# Patient Record
Sex: MALE | Race: WHITE | NOT HISPANIC OR LATINO | Employment: STUDENT | URBAN - METROPOLITAN AREA
[De-identification: names, ages, dates, MRNs, and addresses within clinical notes are randomized per-mention and may not be internally consistent; named-entity substitution may affect disease eponyms.]

---

## 2017-01-23 ENCOUNTER — TRANSCRIBE ORDERS (OUTPATIENT)
Dept: RADIOLOGY | Facility: CLINIC | Age: 67
End: 2017-01-23

## 2017-01-23 ENCOUNTER — HOSPITAL ENCOUNTER (OUTPATIENT)
Dept: RADIOLOGY | Facility: CLINIC | Age: 67
Discharge: HOME | End: 2017-01-23
Payer: COMMERCIAL

## 2017-01-23 DIAGNOSIS — N20.0 CALCULUS OF KIDNEY: ICD-10-CM

## 2017-01-23 PROCEDURE — 74000 HB X-RAY EXAM OF ABDOMEN (SINGLE ANTEROPOSTERIOR VIEW): CPT

## 2017-01-30 ENCOUNTER — ALLSCRIPTS OFFICE VISIT (OUTPATIENT)
Dept: OTHER | Facility: OTHER | Age: 67
End: 2017-01-30

## 2017-01-30 LAB
BILIRUB UR QL STRIP: NORMAL
CLARITY UR: NORMAL
COLOR UR: YELLOW
GLUCOSE (HISTORICAL): NORMAL
HGB UR QL STRIP.AUTO: NORMAL
KETONES UR STRIP-MCNC: NORMAL MG/DL
LEUKOCYTE ESTERASE UR QL STRIP: NORMAL
NITRITE UR QL STRIP: NORMAL
PH UR STRIP.AUTO: 5 [PH]
PROT UR STRIP-MCNC: NORMAL MG/DL
SP GR UR STRIP.AUTO: 1.02
UROBILINOGEN UR QL STRIP.AUTO: NORMAL

## 2017-06-19 ENCOUNTER — ALLSCRIPTS OFFICE VISIT (OUTPATIENT)
Dept: OTHER | Facility: OTHER | Age: 67
End: 2017-06-19

## 2017-06-27 ENCOUNTER — GENERIC CONVERSION - ENCOUNTER (OUTPATIENT)
Dept: OTHER | Facility: OTHER | Age: 67
End: 2017-06-27

## 2017-06-27 LAB
A/G RATIO (HISTORICAL): 1.7 (ref 1.2–2.2)
ALBUMIN SERPL BCP-MCNC: 4.4 G/DL (ref 3.6–4.8)
ALP SERPL-CCNC: 99 IU/L (ref 39–117)
ALT SERPL W P-5'-P-CCNC: 31 IU/L (ref 0–44)
AMBIG ABBREV CMP14 DEFAULT (HISTORICAL): NORMAL
AST SERPL W P-5'-P-CCNC: 34 IU/L (ref 0–40)
BILIRUB SERPL-MCNC: 0.6 MG/DL (ref 0–1.2)
BUN SERPL-MCNC: 17 MG/DL (ref 8–27)
BUN/CREA RATIO (HISTORICAL): 14 (ref 10–24)
CALCIUM SERPL-MCNC: 9.4 MG/DL (ref 8.6–10.2)
CHLORIDE SERPL-SCNC: 100 MMOL/L (ref 96–106)
CHOLEST SERPL-MCNC: 188 MG/DL (ref 100–199)
CHOLEST/HDLC SERPL: 4 RATIO UNITS (ref 0–5)
CO2 SERPL-SCNC: 23 MMOL/L (ref 18–29)
CREAT SERPL-MCNC: 1.22 MG/DL (ref 0.76–1.27)
DEPRECATED RDW RBC AUTO: 13.8 % (ref 12.3–15.4)
EGFR AFRICAN AMERICAN (HISTORICAL): 72 ML/MIN/1.73
EGFR-AMERICAN CALC (HISTORICAL): 62 ML/MIN/1.73
GLUCOSE SERPL-MCNC: 117 MG/DL (ref 65–99)
HBA1C MFR BLD HPLC: 5.9 % (ref 4.8–5.6)
HCT VFR BLD AUTO: 41.7 % (ref 37.5–51)
HDLC SERPL-MCNC: 47 MG/DL
HGB BLD-MCNC: 14 G/DL (ref 12.6–17.7)
LDLC SERPL CALC-MCNC: 119 MG/DL (ref 0–99)
MCH RBC QN AUTO: 30 PG (ref 26.6–33)
MCHC RBC AUTO-ENTMCNC: 33.6 G/DL (ref 31.5–35.7)
MCV RBC AUTO: 90 FL (ref 79–97)
POTASSIUM SERPL-SCNC: 4.8 MMOL/L (ref 3.5–5.2)
RBC (HISTORICAL): 4.66 X10E6/UL (ref 4.14–5.8)
SODIUM SERPL-SCNC: 139 MMOL/L (ref 134–144)
TOT. GLOBULIN, SERUM (HISTORICAL): 2.6 G/DL (ref 1.5–4.5)
TOTAL PROTEIN (HISTORICAL): 7 G/DL (ref 6–8.5)
TRIGL SERPL-MCNC: 111 MG/DL (ref 0–149)
VLDLC SERPL CALC-MCNC: 22 MG/DL (ref 5–40)
WBC # BLD AUTO: 5.6 X10E3/UL (ref 3.4–10.8)

## 2017-06-28 ENCOUNTER — GENERIC CONVERSION - ENCOUNTER (OUTPATIENT)
Dept: OTHER | Facility: OTHER | Age: 67
End: 2017-06-28

## 2017-06-28 LAB
HEP C RNA QUAL (HISTORICAL): <0.1 S/CO RATIO (ref 0–0.9)
INTERPRETATION (HISTORICAL): NORMAL

## 2017-06-29 ENCOUNTER — GENERIC CONVERSION - ENCOUNTER (OUTPATIENT)
Dept: OTHER | Facility: OTHER | Age: 67
End: 2017-06-29

## 2017-08-06 DIAGNOSIS — Z12.5 ENCOUNTER FOR SCREENING FOR MALIGNANT NEOPLASM OF PROSTATE: ICD-10-CM

## 2017-08-06 DIAGNOSIS — N20.0 CALCULUS OF KIDNEY: ICD-10-CM

## 2017-09-06 ENCOUNTER — TRANSCRIBE ORDERS (OUTPATIENT)
Dept: RADIOLOGY | Facility: CLINIC | Age: 67
End: 2017-09-06

## 2017-09-06 ENCOUNTER — APPOINTMENT (OUTPATIENT)
Dept: RADIOLOGY | Facility: CLINIC | Age: 67
End: 2017-09-06
Payer: COMMERCIAL

## 2017-09-06 ENCOUNTER — GENERIC CONVERSION - ENCOUNTER (OUTPATIENT)
Dept: OTHER | Facility: OTHER | Age: 67
End: 2017-09-06

## 2017-09-06 DIAGNOSIS — N20.0 CALCULUS OF KIDNEY: ICD-10-CM

## 2017-09-06 LAB — PROSTATE SPECIFIC ANTIGEN (HISTORICAL): 0.7 NG/ML (ref 0–4)

## 2017-09-06 PROCEDURE — 74000 HB X-RAY EXAM OF ABDOMEN (SINGLE ANTEROPOSTERIOR VIEW): CPT

## 2017-09-13 ENCOUNTER — ALLSCRIPTS OFFICE VISIT (OUTPATIENT)
Dept: OTHER | Facility: OTHER | Age: 67
End: 2017-09-13

## 2017-12-04 ENCOUNTER — ALLSCRIPTS OFFICE VISIT (OUTPATIENT)
Dept: OTHER | Facility: OTHER | Age: 67
End: 2017-12-04

## 2017-12-04 DIAGNOSIS — R73.01 IMPAIRED FASTING GLUCOSE: ICD-10-CM

## 2017-12-04 DIAGNOSIS — I10 ESSENTIAL (PRIMARY) HYPERTENSION: ICD-10-CM

## 2017-12-05 NOTE — PROGRESS NOTES
Assessment    1  Benign essential hypertension (401 1) (I10)   · 10/06   2  Impaired fasting glucose (790 21) (R73 01)    Plan  Benign essential hypertension, Impaired fasting glucose    · (1) COMPREHENSIVE METABOLIC PANEL; Status:Active; Requested for:63Dky5693;    · (1) HEMOGLOBIN A1C; Status:Active; Requested for:58Fve5540;     Discussion/Summary    Patient appears to be doing well--hypertension well controlled on lisinopril 10 mg daily, continue dose  Will plan to recheck blood work in the next few weeks to reassess impaired fasting glucose  RTO 6 months for complete physical exam    The patient was counseled regarding diagnostic results,-- instructions for management,-- risk factor reductions,-- prognosis,-- patient and family education,-- impressions,-- risks and benefits of treatment options,-- importance of compliance with treatment  Possible side effects of new medications were reviewed with the patient/guardian today  The treatment plan was reviewed with the patient/guardian  The patient/guardian understands and agrees with the treatment plan      Chief Complaint  Pt here for 6 months f/u B/P      History of Present Illness  Patient presents for routine blood pressure checkhas history of impaired fasting glucosefeeling well todaymedication problems or concernsbeen trying to follow a healthy diet--avoids most âsweetsâpolyuria, polydipsia, blurry vision, abdominal painnot check his blood sugar at home, no tools to do so   The patient presents for follow-up of essential hypertension  The patient states he has been doing well with his blood pressure control since the last visit  He has no significant interval events  Symptoms: denies impaired vision,-- denies dyspnea,-- denies chest pain,-- denies intermittent leg claudication-- and-- denies lower extremity edema  Associated symptoms include no headache,-- no focal neurologic deficits-- and-- no memory loss  Home monitoring:  The patient is not checking blood pressure at home  Medications: the patient is adherent with his medication regimen  -- He denies medication side effects  Review of Systems   Constitutional: No fever or chills, feels well, no tiredness, no recent weight gain or weight loss  Eyes: No complaints of eye pain, no red eyes, no discharge from eyes, no itchy eyes  ENT: no complaints of earache, no hearing loss, no nosebleeds, no nasal discharge, no sore throat, no hoarseness  Cardiovascular: No complaints of slow heart rate, no fast heart rate, no chest pain, no palpitations, no leg claudication, no lower extremity  Respiratory: No complaints of shortness of breath, no wheezing, no cough, no SOB on exertion, no orthopnea or PND  Gastrointestinal: No complaints of abdominal pain, no constipation, no nausea or vomiting, no diarrhea or bloody stools  Genitourinary: No complaints of dysuria, no incontinence, no hesitancy, no nocturia, no genital lesion, no testicular pain  Musculoskeletal: No complaints of arthralgia, no myalgias, no joint swelling or stiffness, no limb pain or swelling  Integumentary: No complaints of skin rash or skin lesions, no itching, no skin wound, no dry skin  Neurological: No compliants of headache, no confusion, no convulsions, no numbness or tingling, no dizziness or fainting, no limb weakness, no difficulty walking  Psychiatric: Is not suicidal, no sleep disturbances, no anxiety or depression, no change in personality, no emotional problems  Endocrine: No complaints of proptosis, no hot flashes, no muscle weakness, no erectile dysfunction, no deepening of the voice, no feelings of weakness  Hematologic/Lymphatic: No complaints of swollen glands, no swollen glands in the neck, does not bleed easily, no easy bruising  Active Problems  1  Benign essential hypertension (401 1) (I10)   2  Enlarged prostate without lower urinary tract symptoms (luts) (600 00) (N40 0)   3   Erectile dysfunction of non-organic origin (302 72) (F52 21)   4  H/O adenomatous polyp of colon (V12 72) (Z86 010)   5  Impaired fasting glucose (790 21) (R73 01)   6  Rotator cuff tendinitis, left (726 10) (M75 82)    Past Medical History  1  History of Calculus of urinary bladder (594 1) (N21 0)   2  History of Urinary Tract Infection (V13 02)    The active problems and past medical history were reviewed and updated today  Surgical History  1  History of Complete Colonoscopy   2  History of Cystoscopy With Fragmentation Of Bladder Calculus   3  History of Needle Biopsy Of Prostate   4  History of Transurethral Resection Of Prostate (TURP)    The surgical history was reviewed and updated today  Family History  Mother    1  Family history of Hyperlipidemia  Father    2  Family history of Cirrhosis  Family History    3  Denied: Family history of mental disorder   4  Denied: Family history of substance abuse    The family history was reviewed and updated today  Social History     · Being A Social Drinker   · Daily Coffee Consumption (1  Cups/Day)   · Educational Level - Completed Bachelors Degree   · Marital History - Currently    · Never A Smoker   · Occupation:  The social history was reviewed and updated today  The social history was reviewed and is unchanged  Current Meds   1  Lisinopril 10 MG Oral Tablet; take 1 tablet by mouth every day; Therapy: 42WMH7879 to (OFWXVJSX:52YUI2854)  Requested for: 10MYR2314; Last Rx:29Jun2017 Ordered    The medication list was reviewed and updated today  Allergies  1  No Known Drug Allergies    Vitals  Vital Signs    Recorded: 28FFI4068 08:22AM   Temperature 98 3 F   Heart Rate 79   Systolic 985   Diastolic 70   Weight 534 lb 6 oz   BMI Calculated 29 71   BSA Calculated 2 31   O2 Saturation 98       Physical Exam   Constitutional  General appearance: No acute distress, well appearing and well nourished  Eyes  Conjunctiva and lids: No swelling, erythema, or discharge  Ears, Nose, Mouth, and Throat  External inspection of ears and nose: Normal    Oropharynx: Normal with no erythema, edema, exudate or lesions  Pulmonary  Respiratory effort: No increased work of breathing or signs of respiratory distress  Auscultation of lungs: Clear to auscultation, equal breath sounds bilaterally, no wheezes, no rales, no rhonci  Cardiovascular  Auscultation of heart: Normal rate and rhythm, normal S1 and S2, without murmurs  Examination of extremities for edema and/or varicosities: Normal    Abdomen  Abdomen: Non-tender, no masses  Lymphatic  Palpation of lymph nodes in neck: No lymphadenopathy  Musculoskeletal  Gait and station: Normal    Skin  Skin and subcutaneous tissue: Normal without rashes or lesions  Neurologic  Cranial nerves: Cranial nerves 2-12 intact  Psychiatric  Orientation to person, place and time: Normal    Mood and affect: Normal          Health Management  H/O adenomatous polyp of colon   COLONOSCOPY; every 5 years; Last 31FQU2457; Next Due: [id number];  Active    Future Appointments    Date/Time Provider Specialty Site   09/17/2018 09:45 AM Stacie Cevallos, [address]  Urology [facility] Gabriella Munguia       Signatures   Electronically signed by : Rebecca Arredondo DO; Dec  4 2017  8:58AM EST                       (Author)

## 2018-01-12 VITALS
WEIGHT: 232 LBS | DIASTOLIC BLOOD PRESSURE: 78 MMHG | SYSTOLIC BLOOD PRESSURE: 128 MMHG | HEART RATE: 80 BPM | HEIGHT: 74 IN | BODY MASS INDEX: 29.77 KG/M2

## 2018-01-12 NOTE — PROGRESS NOTES
Assessment    1  Nephrolithiasis (592 0) (N20 0)    Plan  Nephrolithiasis    · Hydrocodone-Acetaminophen 5-325 MG Oral Tablet (Norco); TAKE 1 TO 2  TABLETS EVERY 4 TO 6 HOURS AS NEEDED FOR PAIN   Rx By: Fito Griffin; Dispense: 15 Days ; #:15 Tablet; Refill: 0; For: Nephrolithiasis; SOWMYA = N; Print Rx   · Schedule Surgery Treatment  Procedure  Schedule OR [facility] Monday, February 1, 2016  Right #5 special    Preadmission testing as required  Status: Hold For - Scheduling  Requested for:  54TFD9939   Ordered; For: Nephrolithiasis; Ordered By: Fito Griffin Performed:  Due: 23HUM6088    Discussion/Summary  Discussion Summary:   My impression is 7 mm right proximal ureteral calculus  Based on the size and location of the stone I am recommending intervention with right-sided ureteroscopy and holmium laser lithotripsy  I will place a right ureteral stent at that time as well  Risk and benefits of the procedure were discussed and reviewed and informed consent was obtained in the office today  Chief Complaint  Chief Complaint Free Text Note Form: Patient is here for ER f/u nephrolithiasis      History of Present Illness  HPI: Meaghan Robins is a 62-year-old male who underwent TURP in 2013 for urinary retention  He recently presented to the emergency room complaining of right-sided flank pain  A CT scan showed a 7 mm right proximal ureteral calculus with right-sided hydronephrosis  He previously reported some nausea and vomiting which has resolved  He still has intermittent pain and is requiring narcotics  He presents to the office today in follow-up from the emergency room visit  He denies any prior history of kidney stones  He denies any hematuria  He denies any fevers or chills at this time  Active Problems    1  Benign essential hypertension (401 1) (I10)   2  Enlarged prostate without lower urinary tract symptoms (luts) (600 00) (N40 0)   3   Erectile dysfunction of non-organic origin (302 72) (F52 21)   4  Flu vaccine need (V04 81) (Z23)   5  H/O adenomatous polyp of colon (V12 72) (Z86 010)   6  Nephrolithiasis (592 0) (N20 0)   7  Rotator cuff tendinitis, left (726 10) (M75 82)   8  Shoulder pain, left (719 41) (M25 512)   9  Special screening examination for neoplasm of prostate (V76 44) (Z12 5)   10  Urgency of urination (788 63) (R39 15)    Past Medical History    1  History of Calculus of urinary bladder (594 1) (N21 0)   2  History of Urinary Tract Infection (V13 02)    Surgical History    1  History of Complete Colonoscopy   2  History of Cystoscopy With Fragmentation Of Bladder Calculus   3  History of Needle Biopsy Of Prostate   4  History of Transurethral Resection Of Prostate (TURP)    Family History    1  Family history of Hyperlipidemia    2  Family history of Cirrhosis    Social History    · Being A Social Drinker   · Daily Coffee Consumption (1  Cups/Day)   · Educational Level - Completed Bachelors Degree   · Marital History - Currently    · Never A Smoker   · Occupation:    Current Meds   1  Lisinopril 10 MG Oral Tablet; take one tablet by mouth every day; Therapy: 21EJH7856 to (Leobardo Varela)  Requested for: 88KJC0867; Last   Rx:01Jun2015 Ordered    Allergies    1  No Known Drug Allergies    Vitals  Vital Signs [Data Includes: Current Encounter]    Recorded: 12VWY2663 01:30PM   Heart Rate 76   Systolic 528   Diastolic 82   Height 6 ft 1 in   Weight 234 lb 2 08 oz   BMI Calculated 30 89   BSA Calculated 2 29     Physical Exam    Additional Exam:  On examination he is in no acute distress  His abdomen is soft nontender and nondistended   examination reveals no CVA tenderness  Lungs are clear  Cardiac is regular  Results/Data  Diagnostic Studies Reviewed Urology:   CT Scan Review CT scan as above  Future Appointments    Date/Time Provider Specialty Site   06/13/2016 08:00 AM PERLA Tena   Family Medicine [facility]   02/19/2016 08:10 AM PERLA Cotton   Orthopedic Surgery [facility] ORTHO SPECIALIST [place]   08/19/2016 09:45 AM Nicko Lin MD Urology [address]     Signatures   Electronically signed by : Estrella Whitney MD; Jan 28 2016  2:21PM EST                       (Author)

## 2018-01-13 VITALS
HEIGHT: 74 IN | OXYGEN SATURATION: 97 % | HEART RATE: 81 BPM | WEIGHT: 230 LBS | SYSTOLIC BLOOD PRESSURE: 124 MMHG | DIASTOLIC BLOOD PRESSURE: 72 MMHG | TEMPERATURE: 97.5 F | BODY MASS INDEX: 29.52 KG/M2

## 2018-01-13 NOTE — PROGRESS NOTES
Assessment    1  Encounter for preventive health examination (V70 0) (Z00 00)   2  Impaired fasting glucose (790 21) (R73 01)   3  Benign essential hypertension (401 1) (I10)   · 10/06   4  Need for hepatitis C screening test (V73 89) (Z11 59)    Plan  Benign essential hypertension, Health Maintenance, Impaired fasting glucose    · (1) CBC/ PLT (NO DIFF); Status:Active; Requested ZGJ:99CXG4835;    · (1) COMPREHENSIVE METABOLIC PANEL; Status:Active; Requested WEW:99OFU4985;    · (1) HEMOGLOBIN A1C; Status:Active; Requested XPX:86PFQ4905;    · (1) LIPID PANEL, FASTING; Status:Active; Requested IJJ:21JOL0175;   Benign essential hypertension, Impaired fasting glucose    · Follow-up visit in 6 months Evaluation and Treatment  Follow-up  Status: Hold For -  Scheduling  Requested for: 73JYK1152  Health Maintenance    · Alcohol misuse can be a problem with advancing age ; Status:Complete;   Done:  68NXR8831 09:34AM   · Always use a seat belt and shoulder strap when riding or driving a motor vehicle ;  Status:Complete;   Done: 91MAS0884 09:34AM   · Be sure to tell us about all herbal supplements you use ; Status:Complete;   Done:  53RNC0517 09:34AM   · Brush your teeth {freq1} and floss at least once a day ; Status:Complete;   Done:  76NXG2483 09:34AM   · Drink plenty of fluids ; Status:Complete;   Done: 95ASX3222 09:34AM   · Regular aerobic exercise can help reduce stress ; Status:Complete;   Done: 63WQQ0416  09:34AM   · These are things you can do to prevent falls in and around the home ; Status:Complete;    Done: 16XHU5639 09:34AM   · Use a sun block product with an SPF of 15 or more ; Status:Complete;   Done:  41ECK7374 09:34AM   · We encourage you to begin to make lifestyle changes to help control your blood  pressure  These may include losing weight, increasing your activity level, limiting salt in  your diet, decreasing alcohol intake, and eating a diet low in fat and rich in fruits  and vegetables  ; Status:Complete;   Done: 55EUK6433 09:34AM   · We recommend routine visits to a dentist ; Status:Complete;   Done: 08HSL0409 09:34AM   · We recommend that you create an advance directive ; Status:Complete;   Done:  00BYQ6560 09:34AM   · We recommend that you follow the "Mediterranean diet "; Status:Complete;   Done:  61PQD8226 09:34AM   · We recommend that you follow these rules for gun safety ; Status:Complete;   Done:  03UFB0101 09:34AM   · We recommend that you follow these steps to lower your risk of osteoporosis  ;  Status:Complete;   Done: 55STL0617 09:34AM   · Call [phone] if: You have any warning signs of skin cancer ; Status:Complete;    Done: 85GZZ1121 09:34AM   · Call [phone] if: You experience a new kind of chest pain (angina) or pressure ;  Status:Complete;   Done: 15BXU6064 09:34AM  Health Maintenance, Need for hepatitis C screening test    · (1) HEP C ANTIBODY; Status:Active; Requested LSW:84JZS7094;   Screening for depression    · *VB-Depression Screening; Status:Complete - Retrospective By Protocol Authorization;    Done: 41BXA9157 09:28AM    Discussion/Summary  Impression: health maintenance visit, healthy adult male  Currently, he eats an adequate diet and has an adequate exercise regimen  Prostate cancer screening: the risks and benefits of prostate cancer screening were discussed, prostate cancer screening is current and prostate cancer screening is managed by Urology  Colorectal cancer screening: the risks and benefits of colorectal cancer screening were discussed and colorectal cancer screening is current  Screening lab work includes glucose and lipid profile  The immunizations are up to date and DUE FOR PREVNAR-13 -- 1 YR LATER PNEUMOVAX   He was advised to be evaluated by an optometrist and a dentist  Advice and education were given regarding nutrition, aerobic exercise, weight bearing exercise, calcium supplements, vitamin D supplements, cardiovascular risk reduction, sunscreen use, self skin examination and seat belt use  Patient discussion: discussed with the patient  The patient was counseled regarding diagnostic results, instructions for management, risk factor reductions, prognosis, patient and family education, impressions, risks and benefits of treatment options, importance of compliance with treatment  Immunization Counseling The parent/guardian was counseled on the following vaccine components: NZRGGYS-41  Total number of vaccine components counseled: 1  NO BARRIERS TO CARE   Educational resources provided: Diet/exercise, routine screenings, upcoming immunizations  Possible side effects of new medications were reviewed with the patient/guardian today  The treatment plan was reviewed with the patient/guardian  The patient/guardian understands and agrees with the treatment plan      Chief Complaint  Pt here for annual physical exam      History of Present Illness  HM, Adult Male: The patient is being seen for a health maintenance evaluation  The last health maintenance visit was 1 year(s) ago  Social History: Household members include spouse  He is   Work status: working full time and occupation: sales  The patient has never smoked cigarettes  He reports rare alcohol use and drinking 2-5 drinks per week  He has never used illicit drugs  General Health: The patient's health since the last visit is described as good  He has regular dental visits  He complains of vision problems  Vision care includes wearing reading glasses and no recent eye examination  He has hearing loss  (minimal)  Lifestyle:  He consumes a diverse and healthy diet (healthy, diverse -- protein from lean meat/fish )  He is on a Mediterranean-style diet and is generally adherent  He exercises regularly  He exercises 2-3 times per week  Exercise includes strength training, stretching/yoga/pilates and golfs weekly  Reproductive health:  the patient is sexually active  He complains of erectile dysfunction  Screening: cancer screening reviewed and current  Prostate cancer screening includes prostate-specific antigen testing last year  Colorectal cancer screening includes last colonoscopy done 2014  metabolic screening reviewed and current  Metabolic screening includes lipid profile performed 2016 and glucose screening performed last year  risk screening reviewed and current  Cardiovascular risk factors: hypertension, but no diabetes, no stress, no obesity, no tobacco use, no illicit drug use and no sedentary lifestyle  General health risks: previous colon polyps, but no family history of prostate cancer and no osteoporosis risk factors  Safety elements used: seat belt, safe driving habits, sunscreen, smoke detector and carbon monoxide detector  Review of Systems    Constitutional: No fever or chills, feels well, no tiredness, no recent weight gain or weight loss  Eyes: no eyesight problems and no purulent discharge from the eyes  ENT: hearing loss, but no earache, no sore throat and no nasal discharge  Cardiovascular: no chest pain, no palpitations and no extremity edema  Respiratory: no shortness of breath, no cough and no wheezing  Gastrointestinal: No complaints of abdominal pain, no constipation, no nausea or vomiting, no diarrhea or bloody stools  Genitourinary: no dysuria and no incontinence  Musculoskeletal: no arthralgias and no myalgias  Integumentary: no rashes and no skin lesions  Neurological: no headache, no numbness, no tingling and no confusion  Psychiatric: no anxiety and no depression  Endocrine: no hot flashes and no feelings of weakness  Hematologic/Lymphatic: no swollen glands, no tendency for easy bleeding and no tendency for easy bruising  Over the past 2 weeks, how often have you been bothered by the following problems? 1 ) Little interest or pleasure in doing things? Not at all    2 ) Feeling down, depressed or hopeless? Not at all         Active Problems    1  Benign essential hypertension (401 1) (I10)   2  Enlarged prostate without lower urinary tract symptoms (luts) (600 00) (N40 0)   3  Erectile dysfunction of non-organic origin (302 72) (F52 21)   4  Flu vaccine need (V04 81) (Z23)   5  H/O adenomatous polyp of colon (V12 72) (Z86 010)   6  Impaired fasting glucose (790 21) (R73 01)   7  Nephrolithiasis (592 0) (N20 0)   8  Rotator cuff tendinitis, left (726 10) (M75 82)   9  Shoulder pain, left (719 41) (M25 512)   10  Special screening examination for neoplasm of prostate (V76 44) (Z12 5)   11  Urgency of urination (788 63) (R39 15)    Past Medical History    · History of Calculus of urinary bladder (594 1) (N21 0)   · History of Urinary Tract Infection (V13 02)    Surgical History    · History of Complete Colonoscopy   · History of Cystoscopy With Fragmentation Of Bladder Calculus   · History of Needle Biopsy Of Prostate   · History of Transurethral Resection Of Prostate (TURP)    Family History  Mother    · Family history of Hyperlipidemia  Father    · Family history of Cirrhosis  Family History    · Denied: Family history of mental disorder   · Denied: Family history of substance abuse    Social History    · Being A Social Drinker   · Daily Coffee Consumption (1  Cups/Day)   · Educational Level - Completed Bachelors Degree   · Marital History - Currently    · 2 children   · Never A Smoker   · Occupation:   · office furniture sales    Current Meds   1  Lisinopril 10 MG Oral Tablet; take 1 tablet by mouth every day; Therapy: 40QCX5119 to (Evaluate:11Jun2017)  Requested for: 96EGK4239; Last   Rx:16Jun2016 Ordered    Allergies    1   No Known Drug Allergies    Vitals   Recorded: 47FUZ5566 08:59AM   Temperature 97 5 F, Tympanic   Heart Rate 81   Systolic 134   Diastolic 72   Height 6 ft 2 in   Weight 230 lb    BMI Calculated 29 53   BSA Calculated 2 31   O2 Saturation 97     Physical Exam    Constitutional   General appearance: No acute distress, well appearing and well nourished  Head and Face   Head and face: Normal     Palpation of the face and sinuses: No sinus tenderness  Eyes   Conjunctiva and lids: No erythema, swelling or discharge  Pupils and irises: Equal, round, reactive to light  Ears, Nose, Mouth, and Throat   External inspection of ears and nose: Normal     Otoscopic examination: Tympanic membranes translucent with normal light reflex  Canals patent without erythema  Hearing: Normal     Nasal mucosa, septum, and turbinates: Normal without edema or erythema  Lips, teeth, and gums: Normal, good dentition  Oropharynx: Normal with no erythema, edema, exudate or lesions  Neck   Neck: Supple, symmetric, trachea midline, no masses  Thyroid: Normal, no thyromegaly  Pulmonary   Respiratory effort: No increased work of breathing or signs of respiratory distress  Auscultation of lungs: Clear to auscultation  Cardiovascular   Auscultation of heart: Normal rate and rhythm, normal S1 and S2, no murmurs  Carotid pulses: 2+ bilaterally  Examination of extremities for edema and/or varicosities: Normal     Abdomen   Abdomen: Non-tender, no masses  Liver and spleen: No hepatomegaly or splenomegaly  Genitourinary deferred -- seeing Urology next week  Lymphatic   Palpation of lymph nodes in neck: No lymphadenopathy  Musculoskeletal   Gait and station: Normal     Inspection/palpation of digits and nails: Normal without clubbing or cyanosis  Inspection/palpation of joints, bones, and muscles: Normal     Range of motion: Normal     Stability: Normal     Muscle strength/tone: Normal     Skin   Skin and subcutaneous tissue: Normal without rashes or lesions  Neurologic   Cranial nerves: Cranial nerves 2-12 intact  Reflexes: 2+ and symmetric  Sensation: No sensory loss      Psychiatric   Orientation to person, place and time: Normal     Mood and affect: Normal        Health Management  H/O adenomatous polyp of colon   COLONOSCOPY; every 5 years; Last 00VQG6794; Next Due: [id number];  Active    Future Appointments    Date/Time Provider Specialty Site   09/13/2017 09:45 AM Leanna Chatterjee MD Urology [address]     Signatures   Electronically signed by : Ramón Garcia DO; Jun 19 2017  9:35AM EST                       (Author)

## 2018-01-14 VITALS
WEIGHT: 233 LBS | HEIGHT: 73 IN | DIASTOLIC BLOOD PRESSURE: 82 MMHG | SYSTOLIC BLOOD PRESSURE: 140 MMHG | BODY MASS INDEX: 30.88 KG/M2

## 2018-01-15 NOTE — PROGRESS NOTES
Assessment    1  Encounter for preventive health examination (V70 0) (Z00 00)   2  Benign essential hypertension (401 1) (I10)   · 10/06    Plan  Benign essential hypertension    · (1) CBC/PLT/DIFF; Status:Active; Requested FJN:[id number];    · (1) COMPREHENSIVE METABOLIC PANEL; Status:Active; Requested ODC:[id number];    · (1) LIPID PANEL FASTING W DIRECT LDL REFLEX; Status:Active; Requested  ONN:24AVW3603;    · (1) TSH WITH FT4 REFLEX; Status:Active; Requested FNM:65VYW6356; Health Maintenance    · Eat a low fat and low cholesterol diet ; Status:Complete;   Done: 28BWT0301   · Stretch and warm up your muscles during the first 10 minutes , then cool down your  muscles for the last 10 minutes of exercise ; Status:Complete;   Done: 67SID8465   · These are things you can do to prevent falls in and around the home ; Status:Complete;    Done: 71OOX5697   · Use a sun block product with an SPF of 15 or more ; Status:Complete;   Done:  30YVF5800   · We recommend routine visits to a dentist ; Status:Complete;   Done: 76YEY2123   · We recommend that you create an advance directive ; Status:Complete;   Done:  47AGQ7928   · Call [phone] if: You have any warning signs of skin cancer ; Status:Complete;    Done: 55SKM8825   · Call 911 if: You experience a new kind of chest pain (angina) or pressure ;  Status:Complete;   Done: 38AYD7773   · Follow-up visit in 6 months Evaluation and Treatment  Follow-up  Status: Hold For -  Scheduling  Requested for: 20Jun2016    Chief Complaint  Physical      History of Present Illness  HM, Adult Male: The patient is being seen for a health maintenance evaluation  The last health maintenance visit was 1 year(s) ago  Social History: Household members include spouse  He is   Work status: working full time  The patient has never smoked cigarettes  He reports occasional alcohol use  General Health: The patient's health since the last visit is described as good   He has regular dental visits  He complains of vision problems  Vision care includes wearing reading glasses and an eye examination more than a year ago  He denies hearing loss  Immunizations status: up to date  Lifestyle:  He consumes a diverse and healthy diet  He does not have any weight concerns  He exercises regularly  He exercises 3 or more times per week  Exercise includes golf  Reproductive health:  the patient is sexually active  He complains of erectile dysfunction  (Uses injections)  Screening: Prostate cancer screening includes prostate-specific antigen testing last year  Testicular cancer screening includes monthly self testicular examinations  Colorectal cancer screening includes a colonoscopy within the past ten years  Metabolic screening includes lipid profile performed 2015 and glucose screening performed 2015  Safety elements used: seat belt, safe driving habits, sunscreen, smoke detector and carbon monoxide detector  Review of Systems    Constitutional: not feeling poorly and not feeling tired  Eyes: no eye pain, no dryness of the eyes, eyes not red, no purulent discharge from the eyes and no itching of the eyes  ENT: no earache, no nosebleeds, no sore throat and no nasal discharge  Cardiovascular: no chest pain, no palpitations and no extremity edema  Respiratory: no shortness of breath, no cough and no wheezing  Gastrointestinal: no abdominal pain, no constipation, no diarrhea and no blood in stools  Genitourinary: Recent right kidney stone removed in February  , but no dysuria, no urinary hesitancy, no incontinence and no nocturia  Musculoskeletal: His shoulder has improved after a cortisone injection  Integumentary: no rashes and no skin lesions  Neurological: no headache, no dizziness and no fainting  Psychiatric: no anxiety and no depression  Endocrine: erectile dysfunction, but no muscle weakness     Hematologic/Lymphatic: no tendency for easy bleeding and no tendency for easy bruising  Active Problems    1  Benign essential hypertension (401 1) (I10)   2  Enlarged prostate without lower urinary tract symptoms (luts) (600 00) (N40 0)   3  Erectile dysfunction of non-organic origin (302 72) (F52 21)   4  H/O adenomatous polyp of colon (V12 72) (Z86 010)   5  Nephrolithiasis (592 0) (N20 0)   6  Rotator cuff tendinitis, left (726 10) (M75 82)   7  Shoulder pain, left (719 41) (M25 512)   8  Special screening examination for neoplasm of prostate (V76 44) (Z12 5)   9  Urgency of urination (788 63) (R39 15)    Past Medical History    · History of Calculus of urinary bladder (594 1) (N21 0)   · History of Urinary Tract Infection (V13 02)    Surgical History    · History of Complete Colonoscopy   · History of Cystoscopy With Fragmentation Of Bladder Calculus   · History of Needle Biopsy Of Prostate   · History of Transurethral Resection Of Prostate (TURP)    Family History  Mother    · Family history of Hyperlipidemia  Father    · Family history of Cirrhosis    Social History    · Being A Social Drinker   · Daily Coffee Consumption (1  Cups/Day)   · Educational Level - Completed Bachelors Degree   · Marital History - Currently    · 2 children   · Never A Smoker   · Occupation:   · office furniture sales    Current Meds   1  Lisinopril 10 MG Oral Tablet; take 1 tablet by mouth every day; Therapy: 03QDQ4036 to (Evaluate:11Jun2017)  Requested for: 07MVU2026; Last   Rx:16Jun2016 Ordered    Allergies    1  No Known Drug Allergies    Vitals   Recorded: 20Jun2016 11:14AM   Heart Rate 76   Respiration 15   Systolic 413   Diastolic 80   Height 6 ft 0 5 in   Weight 226 lb    BMI Calculated 30 23   BSA Calculated 2 26   O2 Saturation 97     Physical Exam    Constitutional   General appearance: No acute distress, well appearing and well nourished  Head and Face   Head and face: Normal     Eyes   Conjunctiva and lids: No erythema, swelling or discharge      Pupils and irises: Equal, round, reactive to light  Ears, Nose, Mouth, and Throat   External inspection of ears and nose: Normal     Otoscopic examination: Tympanic membranes translucent with normal light reflex  Canals patent without erythema  Hearing: Normal     Nasal mucosa, septum, and turbinates: Normal without edema or erythema  Lips, teeth, and gums: Normal, good dentition  Oropharynx: Normal with no erythema, edema, exudate or lesions  Neck   Neck: Supple, symmetric, trachea midline, no masses  Thyroid: Normal, no thyromegaly  Pulmonary   Respiratory effort: No increased work of breathing or signs of respiratory distress  Auscultation of lungs: Clear to auscultation  Cardiovascular   Palpation of heart: Normal PMI, no thrills  Auscultation of heart: Normal rate and rhythm, normal S1 and S2, no murmurs  Carotid pulses: 2+ bilaterally  Abdominal aorta: Normal     Femoral pulses: 2+ bilaterally  Pedal pulses: 2+ bilaterally  Examination of extremities for edema and/or varicosities: Normal     Abdomen   Abdomen: Non-tender, no masses  Liver and spleen: No hepatomegaly or splenomegaly  Genitourinary Sees Dr Bucky Pimentel for MINE  Lymphatic   Palpation of lymph nodes in neck: No lymphadenopathy  Musculoskeletal   Gait and station: Normal     Inspection/palpation of digits and nails: Normal without clubbing or cyanosis  Skin   Skin and subcutaneous tissue: Normal without rashes or lesions  Neurologic   Cranial nerves: Cranial nerves 2-12 intact  Cortical function: Normal mental status  Psychiatric   Judgment and insight: Normal     Orientation to person, place and time: Normal     Recent and remote memory: Intact  Mood and affect: Normal        Health Management  H/O adenomatous polyp of colon   COLONOSCOPY; every 5 years; Last 06JAU5885; Next Due: [id number];  Active    Signatures   Electronically signed by : PERLA Rao ; Jun 20 2016 11:49AM EST (Author)

## 2018-01-15 NOTE — RESULT NOTES
Verified Results  (LC) HCV Antibody reflex to SUAD 27Jun2017 08:38AM Luz Bloom     Test Name Result Flag Reference   HCV Ab Hepatitis C virus Ab Signal/Cutoff <0 1 s/co ratio  0 0-0 9   Interpretation: Comment     Negative  Not infected with HCV, unless recent infection is suspected or other  evidence exists to indicate HCV infection

## 2018-01-18 NOTE — RESULT NOTES
Verified Results  (1) COMPREHENSIVE METABOLIC PANEL [id number] 11:86CP [facility]     Test Name Result Flag Reference   Glucose, Serum 117 mg/dL H 65-99   BUN 17 mg/dL  8-27   Creatinine, Serum 1 22 mg/dL  0 76-1 27   BUN/Creatinine Ratio 14  10-24   Sodium, Serum 139 mmol/L  134-144   Potassium, Serum 4 8 mmol/L  3 5-5 2   Chloride, Serum 100 mmol/L     Carbon Dioxide, Total 23 mmol/L  18-29   Calcium, Serum 9 4 mg/dL  8 6-10 2   Protein, Total, Serum 7 0 g/dL  6 0-8 5   Albumin, Serum 4 4 g/dL  3 6-4 8   Globulin, Total 2 6 g/dL  1 5-4 5   A/G Ratio 1 7  1 2-2 2   Bilirubin, Total 0 6 mg/dL  0 0-1 2   Alkaline Phosphatase, S 99 IU/L     AST (SGOT) 34 IU/L  0-40   ALT (SGPT) 31 IU/L  0-44   eGFR If NonAfricn Am 62 mL/min/1 73  >59   eGFR If Africn Am 72 mL/min/1 73  >59     (LC) CBC, No Differential/Platelet [id number] 32:57RA [employer]     Test Name Result Flag Reference   WBC 5 6 x10E3/uL  3 4-10 8   RBC 4 66 x10E6/uL  4 14-5 80   Hemoglobin 14 0 g/dL  12 6-17 7   Hematocrit 41 7 %  37 5-51 0   MCV 90 fL  79-97   MCH 30 0 pg  26 6-33 0   MCHC 33 6 g/dL  31 5-35 7   RDW 13 8 %  12 3-15 4     (1) LIPID PANEL, FASTING 27Jun2017 08:38AM Luz Bloom     Test Name Result Flag Reference   Cholesterol, Total 188 mg/dL  100-199   Triglycerides 111 mg/dL  0-149   HDL Cholesterol 47 mg/dL  >39   VLDL Cholesterol Josh 22 mg/dL  5-40   LDL Cholesterol Calc 119 mg/dL H 0-99   T  Chol/HDL Ratio 4 0 ratio units  0 0-5 0   T  Chol/HDL Ratio                                                             Men  Women                                               1/2 Avg  Risk  3 4    3 3                                                   Avg Risk  5 0    4 4                                                2X Avg  Risk  9 6    7 1                                                3X Avg  Risk 23 4   11 0     (1) HEMOGLOBIN A1C 27Jun2017 08:38AM Auther Crystal     Test Name Result Flag Reference   Hemoglobin A1c 5 9 % H 4 8-5 6   Pre-diabetes: 5 7 - 6 4           Diabetes: >6 4           Glycemic control for adults with diabetes: <7 0     [facility]) Danette Velazquez CMP14 Default [id number] 08:38AM Jasbir Kamara     Test Name Result Flag Reference   Danette Velazquez CMP14 Default Comment     A hand-written panel/profile was received from your office  In  accordance with the [facility] Ambiguous Test Code Policy dated July 4185, we have completed your order by using the closest currently  or formerly recognized AMA panel  We have assigned Comprehensive  Metabolic Panel (14), Test Code #239189 to this request   If this  is not the testing you wished to receive on this specimen, please  contact the [address] [place] [address] Client Inquiry/Technical Services Department  to clarify the test order  We appreciate your business

## 2018-01-18 NOTE — RESULT NOTES
Verified Results  (1) CBC/PLT/DIFF [id number] 08:46AM [facility]     Test Name Result Flag Reference   WBC 6 1 x10E3/uL  3 4-10 8   RBC 4 82 x10E6/uL  4 14-5 80   Hemoglobin 14 3 g/dL  12 6-17 7   Hematocrit 43 2 %  37 5-51 0   MCV 90 fL  79-97   MCH 29 7 pg  26 6-33 0   MCHC 33 1 g/dL  31 5-35 7   RDW 13 8 %  12 3-15 4   Platelets 766 W93C8/EH  150-379   Neutrophils 62 %     Lymphs 21 %     Monocytes 12 %     Eos 4 %     Basos 1 %     Neutrophils (Absolute) 3 8 x10E3/uL  1 4-7 0   Lymphs (Absolute) 1 3 x10E3/uL  0 7-3 1   Monocytes(Absolute) 0 7 x10E3/uL  0 1-0 9   Eos (Absolute) 0 2 x10E3/uL  0 0-0 4   Baso (Absolute) 0 1 x10E3/uL  0 0-0 2   Immature Granulocytes 0 %     Immature Grans (Abs) 0 0 x10E3/uL  0 0-0 1     (1) COMPREHENSIVE METABOLIC PANEL [id number] 72:77SA [facility]     Test Name Result Flag Reference   Glucose, Serum 108 mg/dL H 65-99   BUN 18 mg/dL  8-27   Creatinine, Serum 1 02 mg/dL  0 76-1 27   eGFR If NonAfricn Am 77 mL/min/1 73  >59   eGFR If Africn Am 89 mL/min/1 73  >59   BUN/Creatinine Ratio 18  10-22   Sodium, Serum 140 mmol/L  134-144   Potassium, Serum 4 8 mmol/L  3 5-5 2   Chloride, Serum 101 mmol/L     Carbon Dioxide, Total 24 mmol/L  18-29   Calcium, Serum 9 8 mg/dL  8 6-10 2   Protein, Total, Serum 7 0 g/dL  6 0-8 5   Albumin, Serum 4 4 g/dL  3 6-4 8   Globulin, Total 2 6 g/dL  1 5-4 5   A/G Ratio 1 7  1 1-2 5   Bilirubin, Total 0 6 mg/dL  0 0-1 2   Alkaline Phosphatase, S 93 IU/L     AST (SGOT) 33 IU/L  0-40   ALT (SGPT) 29 IU/L  0-44     (1) LIPID PANEL FASTING W DIRECT LDL REFLEX [id number] 08:46AM Jason Finney     Test Name Result Flag Reference   Cholesterol, Total 195 mg/dL  100-199   Triglycerides 133 mg/dL  0-149   HDL Cholesterol 44 mg/dL  >39   According to ATP-III Guidelines, HDL-C >59 mg/dL is considered a  negative risk factor for CHD     LDL Cholesterol Calc 124 mg/dL H 0-99     (LC) TSH Rfx on Abnormal to Free T4 [id number] 08:46AM Conchis Vargas IIILinnea Tomi     Test Name Result Flag Reference   TSH 2 400 uIU/mL  0 450-4 500     [facility] GEE10 Default [id number] 08:46AM Precious Juan     Test Name Result Flag Reference   Kimani Alvarado CMP14 Default Comment     A hand-written panel/profile was received from your office  In  accordance with the [facility] Ambiguous Test Code Policy dated July 7824, we have completed your order by using the closest currently  or formerly recognized AMA panel  We have assigned Comprehensive  Metabolic Panel (14), Test Code #781977 to this request   If this  is not the testing you wished to receive on this specimen, please  contact the [address] [place] [address] Client Inquiry/Technical Services Department  to clarify the test order  We appreciate your business  Plan  Prediabetes    · (1) HEMOGLOBIN A1C; Status:Active; Requested for:[id number];     Discussion/Summary   Minimally elevated blood sugar at 108  I'd like you to do a follow-up test (non-fasting) called a hemoglobin A1C  It will let me know how the sugar control has been over the past three months  Order enclosed  Call if questions  Dr Mireille Campa

## 2018-01-23 VITALS
OXYGEN SATURATION: 98 % | TEMPERATURE: 98.3 F | BODY MASS INDEX: 29.71 KG/M2 | DIASTOLIC BLOOD PRESSURE: 70 MMHG | SYSTOLIC BLOOD PRESSURE: 134 MMHG | WEIGHT: 231.38 LBS | HEART RATE: 79 BPM

## 2018-03-07 LAB
ALBUMIN SERPL-MCNC: 4.7 G/DL (ref 3.6–4.8)
ALBUMIN/GLOB SERPL: 1.4 {RATIO} (ref 1.2–2.2)
ALP SERPL-CCNC: 96 IU/L (ref 39–117)
ALT SERPL-CCNC: 51 IU/L (ref 0–44)
AMBIG ABBREV DEFAULT: NORMAL
AST SERPL-CCNC: 49 IU/L (ref 0–40)
BILIRUB SERPL-MCNC: 0.7 MG/DL (ref 0–1.2)
BUN SERPL-MCNC: 17 MG/DL (ref 8–27)
BUN/CREAT SERPL: 16 (ref 10–24)
CALCIUM SERPL-MCNC: 9.8 MG/DL (ref 8.6–10.2)
CHLORIDE SERPL-SCNC: 99 MMOL/L (ref 96–106)
CO2 SERPL-SCNC: 24 MMOL/L (ref 18–29)
CREAT SERPL-MCNC: 1.07 MG/DL (ref 0.76–1.27)
GLOBULIN SER-MCNC: 3.4 G/DL (ref 1.5–4.5)
GLUCOSE SERPL-MCNC: 106 MG/DL (ref 65–99)
HBA1C MFR BLD: 5.5 % (ref 4.8–5.6)
POTASSIUM SERPL-SCNC: 4.7 MMOL/L (ref 3.5–5.2)
PROT SERPL-MCNC: 8.1 G/DL (ref 6–8.5)
SL AMB EGFR AFRICAN AMERICAN: 84 ML/MIN/1.73
SL AMB EGFR NON AFRICAN AMERICAN: 72 ML/MIN/1.73
SODIUM SERPL-SCNC: 137 MMOL/L (ref 134–144)

## 2018-03-12 ENCOUNTER — TELEPHONE (OUTPATIENT)
Dept: FAMILY MEDICINE CLINIC | Facility: OTHER | Age: 68
End: 2018-03-12

## 2018-06-19 DIAGNOSIS — I10 ESSENTIAL HYPERTENSION: Primary | ICD-10-CM

## 2018-06-19 RX ORDER — LISINOPRIL 10 MG/1
10 TABLET ORAL DAILY
Qty: 30 TABLET | Refills: 0 | Status: SHIPPED | OUTPATIENT
Start: 2018-06-19 | End: 2018-07-20 | Stop reason: SDUPTHER

## 2018-06-25 ENCOUNTER — OFFICE VISIT (OUTPATIENT)
Dept: FAMILY MEDICINE CLINIC | Facility: OTHER | Age: 68
End: 2018-06-25
Payer: COMMERCIAL

## 2018-06-25 VITALS
DIASTOLIC BLOOD PRESSURE: 76 MMHG | TEMPERATURE: 97.4 F | BODY MASS INDEX: 30.03 KG/M2 | SYSTOLIC BLOOD PRESSURE: 122 MMHG | OXYGEN SATURATION: 96 % | HEIGHT: 73 IN | HEART RATE: 76 BPM | WEIGHT: 226.56 LBS

## 2018-06-25 DIAGNOSIS — Z00.00 WELL ADULT EXAM: Primary | ICD-10-CM

## 2018-06-25 DIAGNOSIS — R73.01 IMPAIRED FASTING GLUCOSE: ICD-10-CM

## 2018-06-25 DIAGNOSIS — Z12.11 SCREENING FOR COLON CANCER: ICD-10-CM

## 2018-06-25 DIAGNOSIS — Z23 NEED FOR PROPHYLACTIC VACCINATION AGAINST STREPTOCOCCUS PNEUMONIAE (PNEUMOCOCCUS): ICD-10-CM

## 2018-06-25 DIAGNOSIS — Z13.220 SCREENING, LIPID: ICD-10-CM

## 2018-06-25 DIAGNOSIS — I10 BENIGN ESSENTIAL HYPERTENSION: ICD-10-CM

## 2018-06-25 PROCEDURE — 1160F RVW MEDS BY RX/DR IN RCRD: CPT

## 2018-06-25 PROCEDURE — 1160F RVW MEDS BY RX/DR IN RCRD: CPT | Performed by: FAMILY MEDICINE

## 2018-06-25 PROCEDURE — 90670 PCV13 VACCINE IM: CPT

## 2018-06-25 PROCEDURE — 90471 IMMUNIZATION ADMIN: CPT

## 2018-06-25 PROCEDURE — 99397 PER PM REEVAL EST PAT 65+ YR: CPT | Performed by: FAMILY MEDICINE

## 2018-06-25 NOTE — PATIENT INSTRUCTIONS
DUE FOR A1C RECHECK BETWEEN SEPTEMBER-DECEMBER 2018  DUE FOR PNEUMOVAX 6/26/19;  PREVNAR-13 GIVEN 6/25/18  RECOMMEND Jameson Merino 10

## 2018-06-25 NOTE — PROGRESS NOTES
Subjective:      Patient ID: Shailesh Lawler is a 74 y o  male  HPI   Pt presents for annual physical/wellness exam  Overall health good  Regular eye/dental exams  Diet: healthy, diverse -- protein from lean meat/fish   Exercise: irregular   Colonoscopy: 2014, f/u 5 yrs   Last PSA: 9/2017 -- followed by Urology   Vaccines:  [id number],  Prevnar-13/Pneumovax: due today for '13, Zostavax: 2012    Patient moving to [place] in August 2018        The following portions of the patient's history were reviewed and updated as appropriate: allergies, current medications, past family history, past medical history, past social history, past surgical history and problem list       Current Outpatient Prescriptions:     lisinopril (ZESTRIL) 10 mg tablet, Take 1 tablet (10 mg total) by mouth daily, Disp: 30 tablet, Rfl: 0      Review of Systems   Constitutional: Negative for appetite change, fatigue, fever and unexpected weight change  HENT: Negative for congestion, dental problem, ear pain, postnasal drip, sore throat and tinnitus  Eyes: Negative for pain, discharge and visual disturbance  Respiratory: Negative for cough, shortness of breath and wheezing  Cardiovascular: Negative for chest pain, palpitations and leg swelling  Gastrointestinal: Negative for abdominal pain, constipation, diarrhea, nausea and vomiting  Endocrine: Negative for cold intolerance and heat intolerance  Genitourinary: Negative for difficulty urinating, dysuria, flank pain and urgency  Musculoskeletal: Negative for arthralgias, back pain, joint swelling and myalgias  Skin: Negative for rash and wound  Allergic/Immunologic: Negative for immunocompromised state  Neurological: Negative for dizziness, syncope, speech difficulty, weakness and numbness  Hematological: Negative for adenopathy  Does not bruise/bleed easily  Psychiatric/Behavioral: Negative for confusion, dysphoric mood and sleep disturbance   The patient is not nervous/anxious  Objective:      /76 (BP Location: Left arm, Patient Position: Sitting, Cuff Size: Adult)   Pulse 76   Temp (!) 97 4 °F (36 3 °C) (Tympanic)   Ht 6' 1 25" (1 861 m)   Wt 103 kg (226 lb 9 oz)   SpO2 96%   BMI 29 69 kg/m²          Physical Exam   Constitutional: He is oriented to person, place, and time  He appears well-developed and well-nourished  No distress  HENT:   Head: Normocephalic and atraumatic  Right Ear: External ear normal    Left Ear: External ear normal    Nose: Nose normal    Mouth/Throat: Oropharynx is clear and moist  No oropharyngeal exudate  Eyes: Conjunctivae and EOM are normal  Pupils are equal, round, and reactive to light  No scleral icterus  Neck: Normal range of motion  Neck supple  No thyromegaly present  Cardiovascular: Normal rate, regular rhythm and normal heart sounds  No murmur heard  Pulmonary/Chest: Effort normal and breath sounds normal  No respiratory distress  He has no wheezes  He has no rales  Abdominal: Soft  Bowel sounds are normal  He exhibits no mass  There is no tenderness  Musculoskeletal: Normal range of motion  He exhibits no edema or tenderness  Lymphadenopathy:     He has no cervical adenopathy  Neurological: He is alert and oriented to person, place, and time  He has normal reflexes  No cranial nerve deficit  Skin: Skin is warm and dry  No rash noted  No erythema  Psychiatric: He has a normal mood and affect  His behavior is normal          Assessment/Plan:   Diagnoses and all orders for this visit:    Well adult exam  -     Comprehensive metabolic panel; Future  -     Lipid panel; Future    Screening for colon cancer        -     Due 2019 for colonoscopy    Screening, lipid  -     Lipid panel; Future    Benign essential hypertension  -     Comprehensive metabolic panel; Future    Impaired fasting glucose  -     Comprehensive metabolic panel;  Future  -     A1c due by end of 2018 for surveillance        Healthy appearing 77-year-old male presents for complete physical exam   Blood work from [id number] reviewed during visit  Lipid panel and comprehensive metabolic panel ordered today  PSA managed by Urology  Prevnar 13 administered today per current immunization guidelines  Patient encouraged to establish care with new provider in [place] by end of 2018  The patient indicates understanding of these issues and agrees with the plan        Jose Gilliam DO

## 2018-06-29 LAB
ALBUMIN SERPL-MCNC: 4.3 G/DL (ref 3.6–4.8)
ALBUMIN/GLOB SERPL: 1.6 {RATIO} (ref 1.2–2.2)
ALP SERPL-CCNC: 100 IU/L (ref 39–117)
ALT SERPL-CCNC: 29 IU/L (ref 0–44)
AST SERPL-CCNC: 32 IU/L (ref 0–40)
BILIRUB SERPL-MCNC: 0.9 MG/DL (ref 0–1.2)
BUN SERPL-MCNC: 19 MG/DL (ref 8–27)
BUN/CREAT SERPL: 15 (ref 10–24)
CALCIUM SERPL-MCNC: 9.4 MG/DL (ref 8.6–10.2)
CHLORIDE SERPL-SCNC: 100 MMOL/L (ref 96–106)
CHOLEST SERPL-MCNC: 183 MG/DL (ref 100–199)
CO2 SERPL-SCNC: 24 MMOL/L (ref 20–29)
CREAT SERPL-MCNC: 1.26 MG/DL (ref 0.76–1.27)
GLOBULIN SER-MCNC: 2.7 G/DL (ref 1.5–4.5)
GLUCOSE SERPL-MCNC: 109 MG/DL (ref 65–99)
HDLC SERPL-MCNC: 41 MG/DL
LDLC SERPL CALC-MCNC: 122 MG/DL (ref 0–99)
POTASSIUM SERPL-SCNC: 4.7 MMOL/L (ref 3.5–5.2)
PROT SERPL-MCNC: 7 G/DL (ref 6–8.5)
SL AMB EGFR AFRICAN AMERICAN: 69 ML/MIN/1.73
SL AMB EGFR NON AFRICAN AMERICAN: 59 ML/MIN/1.73
SL AMB VLDL CHOLESTEROL CALC: 20 MG/DL (ref 5–40)
SODIUM SERPL-SCNC: 138 MMOL/L (ref 134–144)
TRIGL SERPL-MCNC: 100 MG/DL (ref 0–149)

## 2018-07-03 ENCOUNTER — TELEPHONE (OUTPATIENT)
Dept: FAMILY MEDICINE CLINIC | Facility: OTHER | Age: 68
End: 2018-07-03

## 2018-07-03 NOTE — TELEPHONE ENCOUNTER
Pt notified and labs mailed     ----- Message from Griffin Espinoza DO sent at 7/3/2018 12:59 PM EDT -----  Please inform pt that blood work is stable -- fasting glucose still mildly elevated at 109 (recommend low carb diet and recheck A1c by end of year)  OK to fwd to patient's new PCP as he will be moving to [place] in August 2018    Thanks!   Griffin Espinoza DO English

## 2018-07-20 DIAGNOSIS — I10 ESSENTIAL HYPERTENSION: ICD-10-CM

## 2018-07-23 RX ORDER — LISINOPRIL 10 MG/1
TABLET ORAL
Qty: 30 TABLET | Refills: 5 | Status: SHIPPED | OUTPATIENT
Start: 2018-07-23 | End: 2018-11-09 | Stop reason: SDUPTHER

## 2018-09-13 DIAGNOSIS — N20.0 CALCULUS OF KIDNEY: ICD-10-CM

## 2018-11-09 DIAGNOSIS — I10 ESSENTIAL HYPERTENSION: ICD-10-CM

## 2018-11-09 RX ORDER — LISINOPRIL 10 MG/1
10 TABLET ORAL DAILY
Qty: 30 TABLET | Refills: 2 | Status: SHIPPED | OUTPATIENT
Start: 2018-11-09 | End: 2019-01-04 | Stop reason: SDUPTHER

## 2018-12-29 DIAGNOSIS — I10 ESSENTIAL HYPERTENSION: ICD-10-CM

## 2019-01-03 RX ORDER — LISINOPRIL 10 MG/1
TABLET ORAL
Qty: 90 TABLET | Refills: 2 | OUTPATIENT
Start: 2019-01-03

## 2019-01-04 DIAGNOSIS — I10 ESSENTIAL HYPERTENSION: ICD-10-CM

## 2019-01-04 RX ORDER — LISINOPRIL 10 MG/1
10 TABLET ORAL DAILY
Qty: 30 TABLET | Refills: 5 | Status: SHIPPED | OUTPATIENT
Start: 2019-01-04 | End: 2019-01-30

## 2019-01-04 RX ORDER — LISINOPRIL 10 MG/1
10 TABLET ORAL DAILY
Qty: 90 TABLET | Refills: 0 | OUTPATIENT
Start: 2019-01-04

## 2019-01-30 DIAGNOSIS — I10 ESSENTIAL HYPERTENSION: ICD-10-CM

## 2019-01-30 RX ORDER — LISINOPRIL 10 MG/1
TABLET ORAL
Qty: 90 TABLET | Refills: 0 | Status: SHIPPED | OUTPATIENT
Start: 2019-01-30 | End: 2019-05-14 | Stop reason: SDUPTHER

## 2019-04-25 DIAGNOSIS — I10 ESSENTIAL HYPERTENSION: ICD-10-CM

## 2019-04-25 RX ORDER — LISINOPRIL 10 MG/1
TABLET ORAL
Qty: 90 TABLET | Refills: 0 | OUTPATIENT
Start: 2019-04-25

## 2019-05-02 DIAGNOSIS — I10 ESSENTIAL HYPERTENSION: ICD-10-CM

## 2019-05-02 RX ORDER — LISINOPRIL 10 MG/1
TABLET ORAL
Qty: 90 TABLET | Refills: 0 | OUTPATIENT
Start: 2019-05-02

## 2019-05-13 ENCOUNTER — TELEPHONE (OUTPATIENT)
Dept: FAMILY MEDICINE CLINIC | Facility: OTHER | Age: 69
End: 2019-05-13

## 2019-05-14 DIAGNOSIS — I10 ESSENTIAL HYPERTENSION: ICD-10-CM

## 2019-05-14 RX ORDER — LISINOPRIL 10 MG/1
10 TABLET ORAL DAILY
Qty: 30 TABLET | Refills: 1 | Status: SHIPPED | OUTPATIENT
Start: 2019-05-14

## 2020-01-16 ENCOUNTER — TELEPHONE (OUTPATIENT)
Dept: UROLOGY | Facility: MEDICAL CENTER | Age: 70
End: 2020-01-16

## 2020-01-16 NOTE — TELEPHONE ENCOUNTER
This is a patient of Dr Shayan Burrows in [place]  Patient moved out of the area and would like his medical records faxed to Dr Lisa Lobo at fax [phone]  I sent patient the email for medical release form and he is going to have it faxed to [phone] or [phone]  He can be reached at [phone]

## 2020-01-21 NOTE — TELEPHONE ENCOUNTER
Received signed authorization from the patient to release information to Dr Mariia Brice, [facility]  Per patient request, Medical Records were faxed to Dr Mariia Brice on 1/21/2020

## 2020-01-21 NOTE — TELEPHONE ENCOUNTER
Dr MEYERS [facility] requesting records to be sent to [phone] patient is at office now

## 2020-02-11 NOTE — TELEPHONE ENCOUNTER
Patients medical records were forwarded to Dr MEYERS [facility] on 1/21/2020 per initial request

## 2020-02-11 NOTE — TELEPHONE ENCOUNTER
Joselyn called to investigate records to have been faxed  He will call back in the event they have not yet been received    Thank you